# Patient Record
Sex: FEMALE | Race: WHITE | NOT HISPANIC OR LATINO | Employment: FULL TIME | URBAN - METROPOLITAN AREA
[De-identification: names, ages, dates, MRNs, and addresses within clinical notes are randomized per-mention and may not be internally consistent; named-entity substitution may affect disease eponyms.]

---

## 2020-11-06 ENCOUNTER — RECORDS - HEALTHEAST (OUTPATIENT)
Dept: LAB | Facility: CLINIC | Age: 25
End: 2020-11-06

## 2020-11-06 LAB
CHOLEST SERPL-MCNC: 99 MG/DL
FASTING STATUS PATIENT QL REPORTED: ABNORMAL
HDLC SERPL-MCNC: 37 MG/DL
LDLC SERPL CALC-MCNC: 49 MG/DL
TRIGL SERPL-MCNC: 63 MG/DL

## 2020-11-10 LAB
BKR LAB AP ABNORMAL BLEEDING: NO
BKR LAB AP BIRTH CONTROL/HORMONES: NORMAL
BKR LAB AP CERVICAL APPEARANCE: NORMAL
BKR LAB AP GYN ADEQUACY: NORMAL
BKR LAB AP GYN INTERPRETATION: NORMAL
BKR LAB AP HPV REFLEX: NORMAL
BKR LAB AP LMP: NORMAL
BKR LAB AP PATIENT STATUS: NORMAL
BKR LAB AP PREVIOUS ABNORMAL: NORMAL
BKR LAB AP PREVIOUS NORMAL: 2017
C TRACH DNA SPEC QL PROBE+SIG AMP: NEGATIVE
HIGH RISK?: NO
PATH REPORT.COMMENTS IMP SPEC: NORMAL
RESULT FLAG (HE HISTORICAL CONVERSION): NORMAL

## 2023-04-21 ENCOUNTER — HOSPITAL ENCOUNTER (EMERGENCY)
Facility: HOSPITAL | Age: 28
Discharge: HOME | End: 2023-04-21
Attending: EMERGENCY MEDICINE | Admitting: EMERGENCY MEDICINE
Payer: COMMERCIAL

## 2023-04-21 VITALS
RESPIRATION RATE: 21 BRPM | SYSTOLIC BLOOD PRESSURE: 112 MMHG | TEMPERATURE: 96.3 F | DIASTOLIC BLOOD PRESSURE: 67 MMHG | HEART RATE: 92 BPM | OXYGEN SATURATION: 97 %

## 2023-04-21 DIAGNOSIS — S51.819A LACERATION OF FOREARM, UNSPECIFIED LATERALITY, INITIAL ENCOUNTER: ICD-10-CM

## 2023-04-21 LAB
ABO/RH(D): NORMAL
ALBUMIN SERPL BCG-MCNC: 4.2 G/DL (ref 3.5–5.2)
ALP SERPL-CCNC: 133 U/L (ref 35–104)
ALT SERPL W P-5'-P-CCNC: 16 U/L (ref 10–35)
ANION GAP SERPL CALCULATED.3IONS-SCNC: 10 MMOL/L (ref 7–15)
ANTIBODY SCREEN: NEGATIVE
AST SERPL W P-5'-P-CCNC: 25 U/L (ref 10–35)
BASOPHILS # BLD AUTO: 0.1 10E3/UL (ref 0–0.2)
BASOPHILS NFR BLD AUTO: 1 %
BILIRUB SERPL-MCNC: 0.4 MG/DL
BUN SERPL-MCNC: 11.6 MG/DL (ref 6–20)
CALCIUM SERPL-MCNC: 9.3 MG/DL (ref 8.6–10)
CHLORIDE SERPL-SCNC: 106 MMOL/L (ref 98–107)
CREAT SERPL-MCNC: 0.75 MG/DL (ref 0.51–0.95)
DEPRECATED HCO3 PLAS-SCNC: 24 MMOL/L (ref 22–29)
EOSINOPHIL # BLD AUTO: 0.1 10E3/UL (ref 0–0.7)
EOSINOPHIL NFR BLD AUTO: 1 %
ERYTHROCYTE [DISTWIDTH] IN BLOOD BY AUTOMATED COUNT: 11.4 % (ref 10–15)
GFR SERPL CREATININE-BSD FRML MDRD: >90 ML/MIN/1.73M2
GLUCOSE SERPL-MCNC: 112 MG/DL (ref 70–99)
HCT VFR BLD AUTO: 43.6 % (ref 35–47)
HGB BLD-MCNC: 15.1 G/DL (ref 11.7–15.7)
IMM GRANULOCYTES # BLD: 0 10E3/UL
IMM GRANULOCYTES NFR BLD: 0 %
INR PPP: 1.15 (ref 0.85–1.15)
LYMPHOCYTES # BLD AUTO: 3.7 10E3/UL (ref 0.8–5.3)
LYMPHOCYTES NFR BLD AUTO: 30 %
MCH RBC QN AUTO: 31.5 PG (ref 26.5–33)
MCHC RBC AUTO-ENTMCNC: 34.6 G/DL (ref 31.5–36.5)
MCV RBC AUTO: 91 FL (ref 78–100)
MONOCYTES # BLD AUTO: 1.2 10E3/UL (ref 0–1.3)
MONOCYTES NFR BLD AUTO: 10 %
NEUTROPHILS # BLD AUTO: 7 10E3/UL (ref 1.6–8.3)
NEUTROPHILS NFR BLD AUTO: 58 %
NRBC # BLD AUTO: 0 10E3/UL
NRBC BLD AUTO-RTO: 0 /100
PLATELET # BLD AUTO: 298 10E3/UL (ref 150–450)
POTASSIUM SERPL-SCNC: 3.9 MMOL/L (ref 3.4–5.3)
PROT SERPL-MCNC: 7 G/DL (ref 6.4–8.3)
RBC # BLD AUTO: 4.8 10E6/UL (ref 3.8–5.2)
SODIUM SERPL-SCNC: 140 MMOL/L (ref 136–145)
SPECIMEN EXPIRATION DATE: NORMAL
WBC # BLD AUTO: 12.1 10E3/UL (ref 4–11)

## 2023-04-21 PROCEDURE — 250N000009 HC RX 250: Performed by: EMERGENCY MEDICINE

## 2023-04-21 PROCEDURE — 86850 RBC ANTIBODY SCREEN: CPT | Performed by: EMERGENCY MEDICINE

## 2023-04-21 PROCEDURE — 99283 EMERGENCY DEPT VISIT LOW MDM: CPT

## 2023-04-21 PROCEDURE — 80053 COMPREHEN METABOLIC PANEL: CPT | Performed by: EMERGENCY MEDICINE

## 2023-04-21 PROCEDURE — 96361 HYDRATE IV INFUSION ADD-ON: CPT | Mod: 59

## 2023-04-21 PROCEDURE — 96360 HYDRATION IV INFUSION INIT: CPT | Mod: 59

## 2023-04-21 PROCEDURE — 85025 COMPLETE CBC W/AUTO DIFF WBC: CPT | Performed by: EMERGENCY MEDICINE

## 2023-04-21 PROCEDURE — 12005 RPR S/N/A/GEN/TRK12.6-20.0CM: CPT

## 2023-04-21 PROCEDURE — 258N000003 HC RX IP 258 OP 636: Performed by: EMERGENCY MEDICINE

## 2023-04-21 PROCEDURE — 36415 COLL VENOUS BLD VENIPUNCTURE: CPT | Performed by: EMERGENCY MEDICINE

## 2023-04-21 PROCEDURE — 85610 PROTHROMBIN TIME: CPT | Performed by: EMERGENCY MEDICINE

## 2023-04-21 RX ORDER — VENLAFAXINE HYDROCHLORIDE 75 MG/1
1 CAPSULE, EXTENDED RELEASE ORAL DAILY
COMMUNITY
Start: 2022-08-22 | End: 2023-08-22

## 2023-04-21 RX ORDER — GINSENG 100 MG
CAPSULE ORAL ONCE
Status: COMPLETED | OUTPATIENT
Start: 2023-04-21 | End: 2023-04-21

## 2023-04-21 RX ORDER — SODIUM CHLORIDE 9 MG/ML
INJECTION, SOLUTION INTRAVENOUS CONTINUOUS
Status: DISCONTINUED | OUTPATIENT
Start: 2023-04-21 | End: 2023-04-21 | Stop reason: HOSPADM

## 2023-04-21 RX ORDER — METHYLCELLULOSE 4000CPS 30 %
POWDER (GRAM) MISCELLANEOUS
Status: DISCONTINUED | OUTPATIENT
Start: 2023-04-21 | End: 2023-04-21

## 2023-04-21 RX ADMIN — BACITRACIN: 500 OINTMENT TOPICAL at 20:46

## 2023-04-21 RX ADMIN — Medication 9 ML: at 18:02

## 2023-04-21 RX ADMIN — BACITRACIN: 500 OINTMENT TOPICAL at 20:49

## 2023-04-21 RX ADMIN — SODIUM CHLORIDE 1000 ML: 9 INJECTION, SOLUTION INTRAVENOUS at 18:01

## 2023-04-21 ASSESSMENT — ACTIVITIES OF DAILY LIVING (ADL): ADLS_ACUITY_SCORE: 35

## 2023-04-21 NOTE — ED TRIAGE NOTES
patient states she missed the wood pane while shutting the window and hit the glass, states she was in a state of anger and feeling overwhelmed and the glass cut her on both wrists. Patient was pale and sweaty with low BP in triage son triage completed in room.

## 2023-04-21 NOTE — ED PROVIDER NOTES
EMERGENCY DEPARTMENT NOTE     Name: Leslie Hope    Age/Sex: 28 year old female   MRN: [id number]   Evaluation Date & Time:  4/21/2023  5:43 PM    PCP:    No primary care provider on file.   ED Provider: Jonathon Weathers D.O.       CHIEF COMPLAINT    Laceration       DIAGNOSIS & DISPOSITION     1. Laceration of forearm, unspecified laterality, initial encounter      DISPOSITION: Home    At the conclusion of the encounter I discussed the results of all of the tests and the disposition. The questions were answered. The patient or family acknowledged understanding and was agreeable with the care plan.    TOTAL CRITICAL CARE TIME (EXCLUDING PROCEDURES): Not applicable    PROCEDURES:     PROCEDURE: Laceration Repair   INDICATIONS: Laceration   PROCEDURE PROVIDER: Dr Jonathon Weathers   SITE: 2 right forearm, 1 left forearm   TYPE/SIZE: simple, clean and no foreign body visualized  4 cm, 5 cm, and 6 cm respectively (total length)   FUNCTIONAL ASSESSMENT: Distal sensation, circulation and motor intact   MEDICATION: 15 mLs of 1% Lidocaine without epinephrine   PREPARATION: soaking with Normal saline   DEBRIDEMENT: no debridement   CLOSURE:  Superficial layer closed with 34 stitches of 5-0 Ethilon simple interrupted    Total number of sutures/staples placed: 34     EMERGENCY DEPARTMENT COURSE/MEDICAL DECISION MAKING   5:49 PM I met with the patient to gather history and to perform my initial exam.  We discussed treatment options and the plan for care while in the Emergency Department.  8:17 PM I performed a laceration repair.  8:41 PM Patient ready for discharge.    Leslie Hope is a 28 year old female without significant past medical history who presents to the emergency department for evaluation of forearm laceration.         Triage note reviewed:     Diagnostic studies:  Imaging:  No orders to display      Lab:  Labs Ordered and Resulted from Time of ED Arrival to Time of ED Departure   COMPREHENSIVE METABOLIC PANEL  - Abnormal       Result Value    Sodium 140      Potassium 3.9      Chloride 106      Carbon Dioxide (CO2) 24      Anion Gap 10      Urea Nitrogen 11.6      Creatinine 0.75      Calcium 9.3      Glucose 112 (*)     Alkaline Phosphatase 133 (*)     AST 25      ALT 16      Protein Total 7.0      Albumin 4.2      Bilirubin Total 0.4      GFR Estimate >90     CBC WITH PLATELETS AND DIFFERENTIAL - Abnormal    WBC Count 12.1 (*)     RBC Count 4.80      Hemoglobin 15.1      Hematocrit 43.6      MCV 91      MCH 31.5      MCHC 34.6      RDW 11.4      Platelet Count 298      % Neutrophils 58      % Lymphocytes 30      % Monocytes 10      % Eosinophils 1      % Basophils 1      % Immature Granulocytes 0      NRBCs per 100 WBC 0      Absolute Neutrophils 7.0      Absolute Lymphocytes 3.7      Absolute Monocytes 1.2      Absolute Eosinophils 0.1      Absolute Basophils 0.1      Absolute Immature Granulocytes 0.0      Absolute NRBCs 0.0     INR - Normal    INR 1.15     TYPE AND SCREEN, ADULT    ABO/RH(D) O POS      Antibody Screen Negative      SPECIMEN EXPIRATION DATE [id number]     ABO/RH TYPE AND SCREEN      Interventions: Laceration repair as per procedure note  Discharge Vital Signs:/67   Pulse 92   Temp (!) 96.3  F (35.7  C) (Temporal)   Resp 21   SpO2 97%   Medical Decision Making  Patient on initial presentation was diaphoretic and hypotensive without tachycardia.  Suspect vasovagal episode.  Hemoglobin 15.  Patient had 2 lacerations on the right forearm and 1 on the left.  There was no foreign body and no apparent disruption of the forearm flexor tendons.  Distal CSM including radial pulse and normal Alfredo's test on both arms.  Normal sensation and strength.  Patient lacerations were repaired as per procedure note.  She tolerated procedures well.  Patient is up-to-date on tetanus immunization.  Patient will be discharged with routine wound care.  Recommendation for suture removal in 10 days.  Signs of  infection redness swelling or drainage will return to the emergency department.    History:    Supplemental history from: Documented in chart, if applicable and Friend    External Record(s) reviewed: Documented in chart, if applicable.    Work Up:    Chart documentation includes differential considered and any EKGs or imaging independently interpreted by provider, where specified.    In additional to work up documented, I considered the following work up: Documented in chart, if applicable.    External consultation:    Discussion of management with another provider: Documented in chart, if applicable    Complicating factors:    Care impacted by chronic illness: N/A    Care affected by social determinants of health: N/A    Disposition considerations: Discharge. No recommendations on prescription strength medication(s). See documentation for any additional details.      @@@    ED INTERVENTIONS     Medications   0.9% sodium chloride BOLUS (0 mLs Intravenous Stopped 4/21/23 1932)     Followed by   sodium chloride 0.9% infusion (has no administration in time range)   bacitracin ointment (has no administration in time range)   bacitracin ointment (has no administration in time range)   bacitracin ointment (has no administration in time range)   lido-EPINEPHrine-tetracaine (LET) topical gel GEL 3 mL (9 mLs Topical $Given 4/21/23 1802)       DISCHARGE MEDICATIONS        Review of your medicines      UNREVIEWED medicines. Ask your doctor about these medicines      Dose / Directions   venlafaxine 75 MG 24 hr capsule  Commonly known as: EFFEXOR XR      Dose: 1 capsule  Take 1 capsule by mouth daily  Refills: 0              INFORMATION SOURCE AND LIMITATIONS    History/Exam limitations: n/a  Patient information was obtained from: friend  Use of : N/A     HISTORY OF PRESENT ILLNESS   Leslie Hope is a 28 year old year old female with a relevant past history of pulmonary stenosis, depression, PTSD, and TRUDY, who  presents to this ED via walk-in for evaluation of lacerations.    Per friend at bedside, patient punched a window with both hands around 5:15 PM today (~30 minutes ago). She had missed the wood pane while shutting the window and hit the glass. The window shattered, and she sustained 3 lacerations to her forearms. Bleeding is controlled in the ED. They had placed towels over the lacerations. He notes the patient has pulmonary stenosis. She generally has low blood pressure. Her blood pressure is currently 66/36. She appears diaphoretic. No other current complaints.    REVIEW OF SYSTEMS:   Constitutional: Negative for  fever. Positive for diaphoretic.  HENT: Negative for URI symptoms or sore throat.    Cardiac: Negative for  chest pain,palpitations, near syncope or syncope. Positive for low blood pressure.  Respiratory: Negative for cough and shortness of breath.    Gastrointestinal: Negative for abdominal pain, nausea, vomiting, constipation, diarrhea, rectal bleeding or melena.  Genitourinary: Negative for dysuria, flank pain and hematuria.   Musculoskeletal: Negative for back pain.   Skin: Negative for  Rash. Positive for lacerations.  Neurological: Negative for dizziness, headache, syncope, speech difficulty, unilateral weakness or imbalance with walking.   Hematological: Negative for adenopathy. Does not bruise/bleed easily.   Psychiatric/Behavioral: Negative for confusion.     PATIENT HISTORY   No past medical history on file.  There is no problem list on file for this patient.    No past surgical history on file.    No Known Allergies    OUTPATIENT MEDICATIONS     New Prescriptions    No medications on file      Vitals:    04/21/23 1900 04/21/23 1915 04/21/23 1930 04/21/23 2000   BP: 114/72 114/73 118/75 109/70   Pulse: 88 87 96 96   Resp: 25 24 25 26   Temp:       TempSrc:       SpO2: 100% 100% 98% 97%       Physical Exam   Constitutional: Oriented to person, place, and time. Appears well-developed and  well-nourished.   HEENT:    Head: Atraumatic.   Neck: Normal range of motion. Neck supple.   Cardiovascular: Normal rate, regular rhythm and normal heart sounds.    Pulmonary/Chest: Normal effort  and breath sounds normal.   Abdominal: Soft. Bowel sounds are normal.   Musculoskeletal: Bilateral forearm lacerations  Neurological: Alert and oriented to person, place, and time. Normal strength. No sensory deficit. No cranial nerve deficit.  Skin: 2 lacerations on the volar aspect of the right forearm and 1 on the lower aspect of the left forearm.  All lacerations are full-thickness.  No foreign body identified.  Minimal bleeding without apparent arterial disruption.  Patient has no pain with resisted flexion and sensorimotor function of the hand including forearm flexors are intact.  Psychiatric: Normal mood and affect. Behavior is normal. Thought content normal.     DIAGNOSTICS    LABORATORY FINDINGS (REVIEWED AND INTERPRETED):  Labs Ordered and Resulted from Time of ED Arrival to Time of ED Departure   COMPREHENSIVE METABOLIC PANEL - Abnormal       Result Value    Sodium 140      Potassium 3.9      Chloride 106      Carbon Dioxide (CO2) 24      Anion Gap 10      Urea Nitrogen 11.6      Creatinine 0.75      Calcium 9.3      Glucose 112 (*)     Alkaline Phosphatase 133 (*)     AST 25      ALT 16      Protein Total 7.0      Albumin 4.2      Bilirubin Total 0.4      GFR Estimate >90     CBC WITH PLATELETS AND DIFFERENTIAL - Abnormal    WBC Count 12.1 (*)     RBC Count 4.80      Hemoglobin 15.1      Hematocrit 43.6      MCV 91      MCH 31.5      MCHC 34.6      RDW 11.4      Platelet Count 298      % Neutrophils 58      % Lymphocytes 30      % Monocytes 10      % Eosinophils 1      % Basophils 1      % Immature Granulocytes 0      NRBCs per 100 WBC 0      Absolute Neutrophils 7.0      Absolute Lymphocytes 3.7      Absolute Monocytes 1.2      Absolute Eosinophils 0.1      Absolute Basophils 0.1      Absolute Immature  Granulocytes 0.0      Absolute NRBCs 0.0     INR - Normal    INR 1.15     TYPE AND SCREEN, ADULT    ABO/RH(D) O POS      Antibody Screen Negative      SPECIMEN EXPIRATION DATE [id number]     ABO/RH TYPE AND SCREEN             Procedure:              I, Kay Jorgensen, am serving as a scribe to document services personally performed by Jonathon Weathers D.O., based on my observation and the provider s statements to me.    I, Jonathon Weathers D.O., attest that Kay Jorgensen is acting in a scribe capacity, has observed my performance of the services and has documented them in accordance with my direction.    Jonathon Weathers D.O.  EMERGENCY MEDICINE   04/21/23  [facility] EMERGENCY DEPARTMENT  [facility] [facility] [address]  [phone]  Dept: [phone]     Jonathon Weathers DO  04/22/23 0100

## 2023-04-21 NOTE — ED NOTES
Pt cool to touch. Has 3 lacerations on bilat arms. States was frustrated and stressed and punched a window. Partner states there was a lot of blood at scene. Pt initially hypotensive in triage. Dr. Weathers in room immediately. Line and labs. + radial pulse. Pt a/ox3. Bp better when in bed. Wounds dressed and let applied. Denies SI or HI. States last tetanus 2019. States iud

## 2023-04-22 NOTE — DISCHARGE INSTRUCTIONS
Use Ibu profen and Tylenol for pain managenent .If signs of infection redness swelling or drainage return to the emergency department.

## 2024-08-09 ENCOUNTER — PRE VISIT (OUTPATIENT)
Dept: SURGERY | Facility: CLINIC | Age: 29
End: 2024-08-09
Payer: COMMERCIAL

## 2024-08-09 NOTE — TELEPHONE ENCOUNTER
Patient Telephone Reminder Call    Date of call:  08/09/24  Phone numbers:  Home number on file [phone] (home)    Reached patient/confirmed appointment:  Mailbox full  Appointment with:   Dr. Gumaro Kate  Reason for visit:  ECF  Remind patient that this visit is a consultation only, NO procedure:  Yes  Can this visit be changed to a video visit:  No

## 2024-08-12 ENCOUNTER — OFFICE VISIT (OUTPATIENT)
Dept: SURGERY | Facility: CLINIC | Age: 29
End: 2024-08-12
Payer: COMMERCIAL

## 2024-08-12 ENCOUNTER — PRE VISIT (OUTPATIENT)
Dept: SURGERY | Facility: CLINIC | Age: 29
End: 2024-08-12

## 2024-08-12 VITALS
HEIGHT: 66 IN | BODY MASS INDEX: 20.06 KG/M2 | SYSTOLIC BLOOD PRESSURE: 114 MMHG | OXYGEN SATURATION: 98 % | WEIGHT: 124.8 LBS | DIASTOLIC BLOOD PRESSURE: 82 MMHG | HEART RATE: 82 BPM

## 2024-08-12 DIAGNOSIS — T14.8XXA OPEN WOUND: Primary | ICD-10-CM

## 2024-08-12 PROCEDURE — 99203 OFFICE O/P NEW LOW 30 MIN: CPT | Performed by: SURGERY

## 2024-08-12 RX ORDER — AMOXICILLIN 250 MG
1 CAPSULE ORAL
COMMUNITY
Start: 2024-08-05

## 2024-08-12 RX ORDER — ACETAMINOPHEN 325 MG/1
325-650 TABLET ORAL
COMMUNITY
Start: 2024-06-20

## 2024-08-12 RX ORDER — IBUPROFEN 600 MG/1
600 TABLET, FILM COATED ORAL
COMMUNITY
Start: 2024-06-27

## 2024-08-12 RX ORDER — FAMOTIDINE 20 MG/1
20 TABLET, FILM COATED ORAL
COMMUNITY
Start: 2024-06-20

## 2024-08-12 RX ORDER — HYDROXYZINE PAMOATE 25 MG/1
25 CAPSULE ORAL
COMMUNITY
Start: 2024-07-01

## 2024-08-12 RX ORDER — VITAMIN B COMPLEX
1 TABLET ORAL DAILY
COMMUNITY
Start: 2024-06-20

## 2024-08-12 RX ORDER — BISACODYL 10 MG
10 SUPPOSITORY, RECTAL RECTAL
COMMUNITY
Start: 2024-07-19

## 2024-08-12 RX ORDER — SODIUM FLUORIDE 5 MG/ML
PASTE, DENTIFRICE DENTAL
COMMUNITY
Start: 2023-02-03

## 2024-08-12 RX ORDER — POLYETHYLENE GLYCOL 3350 17 G/17G
17 POWDER, FOR SOLUTION ORAL
COMMUNITY
Start: 2024-07-01

## 2024-08-12 RX ORDER — MIRTAZAPINE 7.5 MG/1
7.5 TABLET, FILM COATED ORAL
COMMUNITY
Start: 2024-06-27

## 2024-08-12 ASSESSMENT — PAIN SCALES - GENERAL: PAINLEVEL: MILD PAIN (3)

## 2024-08-12 NOTE — NURSING NOTE
"Chief Complaint   Patient presents with    New Patient     ECF       Vitals:    08/12/24 0924   BP: 114/82   BP Location: Left arm   Patient Position: Sitting   Cuff Size: Adult Regular   Pulse: 82   SpO2: 98%   Weight: 56.6 kg (124 lb 12.8 oz)   Height: 1.676 m (5' 6\")       Body mass index is 20.14 kg/m .                          Speedy Nugent EMT    "

## 2024-08-12 NOTE — PROGRESS NOTES
Leslie Hope comes from [facility] with possible fistula.  She had a laparoscopic converted to open tubal ligation in June.  This resulted in a bowel injury and required an exploratory laparotomy bowel resection.  They completed the tubal ligation at that point.  The wound was left open to heal by secondary intention.  In the initial part of her convalescence there was significant drainage from the wound and a concern for an enterocutaneous fistula.  Since she has been home the drainage has stopped and the wound has closed.    A/P: Normal healing, open postoperative midline wound with either chronic drainage or an enterocutaneous fistula that has now closed.  I reassured her that everything is healing normally and that the remaining granulation tissue which measures 5 cm x 0.5 cm should have epithelialization soon.  I told her that if drainage returns she should return and see me, however, the fact that she has no symptoms and I do not think this will be an issue.    Bola Ktae MD    No past medical history on file.  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  She then had an exploratory laparotomy due to perforation from a feeding tube.    No past surgical history on file.     No Known Allergies      Current Outpatient Medications:     acetaminophen (TYLENOL) 325 MG tablet, Take 325-650 mg by mouth, Disp: , Rfl:     bisacodyl (DULCOLAX) 10 MG suppository, Place 10 mg rectally, Disp: , Rfl:     Cyanocobalamin 50 MCG TABS, Take 50 mcg by mouth daily, Disp: , Rfl:     famotidine (PEPCID) 20 MG tablet, Take 20 mg by mouth, Disp: , Rfl:     hydrOXYzine nina (VISTARIL) 25 MG capsule, Take 25 mg by mouth, Disp: , Rfl:     ibuprofen (ADVIL/MOTRIN) 600 MG tablet, Take 600 mg by mouth, Disp: , Rfl:     mirtazapine (REMERON) 7.5 MG tablet, Take 7.5 mg by mouth, Disp: , Rfl:     polyethylene glycol (MIRALAX) 17 GM/Dose powder, Take 17 g by mouth, Disp: , Rfl:     senna-docusate  "(SENOKOT-S/PERICOLACE) 8.6-50 MG tablet, Take 1 tablet by mouth, Disp: , Rfl:     sodium fluoride 1.1 % CREA, Brush with pea sized amount at bedtime.  Spit out excess .  Do not rinse, Disp: , Rfl:     Vitamin D3 (CHOLECALCIFEROL) 25 mcg (1000 units) tablet, Take 1 tablet by mouth daily, Disp: , Rfl:     venlafaxine (EFFEXOR XR) 75 MG 24 hr capsule, Take 1 capsule by mouth daily, Disp: , Rfl:     family history is not on file.    Social History     Tobacco Use    Smoking status: Never    Smokeless tobacco: Never       EXAM  /82 (BP Location: Left arm, Patient Position: Sitting, Cuff Size: Adult Regular)   Pulse 82   Ht 1.676 m (5' 6\")   Wt 56.6 kg (124 lb 12.8 oz)   SpO2 98%   BMI 20.14 kg/m    Abdomen is soft nontender nondistended there is a vertical midline incision with a palpable healing ridge the wound is closed in the cephalad portion of the caudal 5 cm this slightly opened with healthy granulation tissue that is flat at the level of the skin and measures about 0.5 cm across.  There are some bridges of epithelialization across this granulation tissue.  "

## 2024-08-12 NOTE — PATIENT INSTRUCTIONS
You met with Dr. Gumaro Kate.      Today's visit instructions:    Return to the Surgery Clinic on an as needed basis. Continue wound care are recommended by Dr. Kate.     If you have questions please contact Deb RN or Kelley RN during regular clinic hours, Monday through Friday 7:30 AM - 4:00 PM, or you can contact us via [employer] at anytime.       If you have urgent needs after-hours, weekends, or holidays please call the hospital at [phone] and ask to speak with our on-call General Surgery Team.    Appointment [phone]  Nurse Advice (Deb or Kelley): [phone]   Surgery Scheduler (Christina): [phone]  Fax: [phone]

## 2024-08-12 NOTE — LETTER
8/12/2024       RE: Leslie Hope  [address] [address]     Dear Colleague,    Thank you for referring your patient, Leslie Hope, to the [facility] GENERAL SURGERY [facility] [place] at [facility]. Please see a copy of my visit note below.    Leslie Hope comes from [facility] with possible fistula.  She had a laparoscopic converted to open tubal ligation in June.  This resulted in a bowel injury and required an exploratory laparotomy bowel resection.  They completed the tubal ligation at that point.  The wound was left open to heal by secondary intention.  In the initial part of her convalescence there was significant drainage from the wound and a concern for an enterocutaneous fistula.  Since she has been home the drainage has stopped and the wound has closed.    A/P: Normal healing, open postoperative midline wound with either chronic drainage or an enterocutaneous fistula that has now closed.  I reassured her that everything is healing normally and that the remaining granulation tissue which measures 5 cm x 0.5 cm should have epithelialization soon.  I told her that if drainage returns she should return and see me, however, the fact that she has no symptoms and I do not think this will be an issue.    Bola Kate MD    No past medical history on file.  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  She then had an exploratory laparotomy due to perforation from a feeding tube.    No past surgical history on file.     No Known Allergies      Current Outpatient Medications:      acetaminophen (TYLENOL) 325 MG tablet, Take 325-650 mg by mouth, Disp: , Rfl:      bisacodyl (DULCOLAX) 10 MG suppository, Place 10 mg rectally, Disp: , Rfl:      Cyanocobalamin 50 MCG TABS, Take 50 mcg by mouth daily, Disp: , Rfl:      famotidine (PEPCID) 20 MG tablet, Take 20 mg by mouth, Disp: , Rfl:       "hydrOXYzine nina (VISTARIL) 25 MG capsule, Take 25 mg by mouth, Disp: , Rfl:      ibuprofen (ADVIL/MOTRIN) 600 MG tablet, Take 600 mg by mouth, Disp: , Rfl:      mirtazapine (REMERON) 7.5 MG tablet, Take 7.5 mg by mouth, Disp: , Rfl:      polyethylene glycol (MIRALAX) 17 GM/Dose powder, Take 17 g by mouth, Disp: , Rfl:      senna-docusate (SENOKOT-S/PERICOLACE) 8.6-50 MG tablet, Take 1 tablet by mouth, Disp: , Rfl:      sodium fluoride 1.1 % CREA, Brush with pea sized amount at bedtime.  Spit out excess .  Do not rinse, Disp: , Rfl:      Vitamin D3 (CHOLECALCIFEROL) 25 mcg (1000 units) tablet, Take 1 tablet by mouth daily, Disp: , Rfl:      venlafaxine (EFFEXOR XR) 75 MG 24 hr capsule, Take 1 capsule by mouth daily, Disp: , Rfl:     family history is not on file.    Social History     Tobacco Use     Smoking status: Never     Smokeless tobacco: Never       EXAM  /82 (BP Location: Left arm, Patient Position: Sitting, Cuff Size: Adult Regular)   Pulse 82   Ht 1.676 m (5' 6\")   Wt 56.6 kg (124 lb 12.8 oz)   SpO2 98%   BMI 20.14 kg/m    Abdomen is soft nontender nondistended there is a vertical midline incision with a palpable healing ridge the wound is closed in the cephalad portion of the caudal 5 cm this slightly opened with healthy granulation tissue that is flat at the level of the skin and measures about 0.5 cm across.  There are some bridges of epithelialization across this granulation tissue.      Again, thank you for allowing me to participate in the care of your patient.      Sincerely,    Bola Kate MD    "

## 2024-09-28 ENCOUNTER — HEALTH MAINTENANCE LETTER (OUTPATIENT)
Age: 29
End: 2024-09-28